# Patient Record
Sex: FEMALE | Race: WHITE | NOT HISPANIC OR LATINO | Employment: STUDENT
[De-identification: names, ages, dates, MRNs, and addresses within clinical notes are randomized per-mention and may not be internally consistent; named-entity substitution may affect disease eponyms.]

---

## 2018-11-28 ENCOUNTER — OFFICE VISIT (OUTPATIENT)
Dept: OTOLARYNGOLOGY | Facility: CLINIC | Age: 28
End: 2018-11-28

## 2018-11-28 DIAGNOSIS — Z98.890 POSTOPERATIVE STATE: Primary | ICD-10-CM

## 2018-11-28 RX ORDER — NORGESTIMATE AND ETHINYL ESTRADIOL 0.25-0.035
1 KIT ORAL DAILY
COMMUNITY

## 2018-11-28 NOTE — LETTER
11/28/2018      RE: Lora Blakely  [address]  [facility] [address]       This office note has been dictated.     VANESSA CISNEROS MD

## 2018-11-28 NOTE — LETTER
11/28/2018       RE: Lora Blakely  [address]  [facility] [address]     Dear Colleague,    Thank you for referring your patient, Lora Blakely, to the [facility] at [facility]. Please see a copy of my visit note below.    This office note has been dictated.     Again, thank you for allowing me to participate in the care of your patient.      Sincerely,    VANESSA CISNEROS MD

## 2018-11-30 NOTE — PROGRESS NOTES
Service Date: 2018      She has an excellent airway.  ...images for teaching.         VANESSA CISNEROS MD             D: 2018   T: 2018   MT: sidney      Name:     SNEHA JEFFERS   MRN:      [id number]        Account:      [id number]   :      1990           Service Date: 2018      Document: [id number]

## 2019-03-13 ENCOUNTER — OFFICE VISIT (OUTPATIENT)
Dept: PLASTIC SURGERY | Facility: CLINIC | Age: 29
End: 2019-03-13
Payer: COMMERCIAL

## 2019-03-13 DIAGNOSIS — Z98.890 POSTOPERATIVE STATE: Primary | ICD-10-CM

## 2019-03-13 NOTE — LETTER
March 13, 2019  Re: Lora Lustanleysongbelinda  1990    Dear Dr. Proctor,    Thank you so much for referring Lora Blakely to the [facility]. I had the pleasure of visiting with Lora today.     Attached you will find a copy of my note. Please feel free to reach out to me with any questions, [phone].     This office note has been dictated.       Your trust in our practice and care is much appreciated.    Sincerely,  VANESSA CISNEROS MD

## 2019-03-13 NOTE — LETTER
Date:March 14, 2019      Patient was self referred, no letter generated. Do not send.        [facility] Health Information

## 2019-03-14 NOTE — PROGRESS NOTES
Service Date: 2019      Ms. Jeffers is back.  I am seeing her following her rhinoplasty.  She is delighted with the outcome at this point.  She is very happy with how things have progressed and she would like us to carry out a chin implant for her.  She will be in contact with us about that in the future.  Photos taken today.         VANESSA CISNEROS MD             D: 2019   T: 2019   MT: sidney      Name:     SNHEA JEFFERS   MRN:      [id number]        Account:      [id number]   :      1990           Service Date: 2019      Document: [id number]

## 2019-03-18 NOTE — NURSING NOTE
Chief Complaint   Patient presents with     Post-op Visit     Patient asked Dr. Miranda about a possible chin implant surgery.  She will call me if she wants a cosmetic quote for this surgery.  Heavenly Gill, Patient Coordinator

## 2019-07-24 ENCOUNTER — OFFICE VISIT (OUTPATIENT)
Dept: PLASTIC SURGERY | Facility: CLINIC | Age: 29
End: 2019-07-24
Payer: COMMERCIAL

## 2019-07-24 DIAGNOSIS — Z98.890 POSTOPERATIVE STATE: Primary | ICD-10-CM

## 2019-07-24 NOTE — LETTER
July 24, 2019  Re: Lora Lustanleysongbelinda  1990    Dear Dr. Proctor,    Thank you so much for referring Lora Blakely to the [facility]. I had the pleasure of visiting with Lora today.     Attached you will find a copy of my note. Please feel free to reach out to me with any questions, [phone].       This office note has been dictated.      Your trust in our practice and care is much appreciated.    Sincerely,  VANESSA CISNEROS MD

## 2019-07-24 NOTE — LETTER
2019       RE: Lora Jeffers  [address]  [facility] [address]     Dear Colleague,    Thank you for referring your patient, Lora Jeffers, to the [facility]. Please see a copy of my visit note below.    Service Date: 2019      Lora is in today.  She visited with Dr. Hickman, our fellow. She is very happy with her nose. Her airway is good.  She is a bit concerned about what a chin implant might mean in terms of bony resorption.  Certainly, there is nothing mandatory about it, although it would create an anesthetic enhancement for her.  She ought not to feel any pressure to make that decision.  She worked with Dr. Hickman and Heavenly today before I had a chance to visit with her.         VANESSA CISNEROS MD           D: 2019   T: 2019   MT: ms      Name:     LORA JEFFERS   MRN:      [id number]        Account:      [id number]   :      1990           Service Date: 2019      Document: [id number]            home management/community/leisure/self-care

## 2019-07-24 NOTE — PROGRESS NOTES
This office note has been dictated.   no deformity, pain or tenderness. no restriction of movement/supple/trachea midline

## 2019-07-24 NOTE — NURSING NOTE
Chief Complaint   Patient presents with     Post-op Visit     rhinoplasty     Obtained photo documentation.

## 2019-07-25 ENCOUNTER — TELEPHONE (OUTPATIENT)
Dept: PLASTIC SURGERY | Facility: CLINIC | Age: 29
End: 2019-07-25

## 2019-07-25 NOTE — TELEPHONE ENCOUNTER
I left a message for Lora to let her know Dr. Miranda would like her to schedule a follow up visit in 4-6 months from now. Heavenly Gill, Patient Coordinator

## 2019-07-25 NOTE — PROGRESS NOTES
Service Date: 2019      Lora is in today.  She visited with Dr. Hickman, our fellow. She is very happy with her nose. Her airway is good.  She is a bit concerned about what a chin implant might mean in terms of bony resorption.  Certainly, there is nothing mandatory about it, although it would create an anesthetic enhancement for her.  She ought not to feel any pressure to make that decision.  She worked with Dr. Hickman and Heavenly today before I had a chance to visit with her.         VANESSA CISNEROS MD             D: 2019   T: 2019   MT: ms      Name:     LORA JEFFERS   MRN:      [id number]        Account:      [id number]   :      1990           Service Date: 2019      Document: [id number]

## 2019-12-11 ENCOUNTER — OFFICE VISIT (OUTPATIENT)
Dept: PLASTIC SURGERY | Facility: CLINIC | Age: 29
End: 2019-12-11
Payer: COMMERCIAL

## 2019-12-11 DIAGNOSIS — Z98.890 POSTOPERATIVE STATE: Primary | ICD-10-CM

## 2019-12-11 NOTE — LETTER
2019       RE: Lora Jeffers  [address]  [facility] [address]     Dear Colleague,    Thank you for referring your patient, Loar Jeffers, to the [facility] at [facility]. Please see a copy of my visit note below.    Service Date: 2019      Lora is in today.  Fortunately, Dr. Infante saw her for me.   She is happy with her outcome.  She breathes well.  She will be back to see me in several months.         VANESSA CISNEROS MD             D: 12/15/2019   T: 12/15/2019   MT: ms      Name:     LORA JEFFERS   MRN:      [id number]        Account:      [id number]   :      1990           Service Date: 2019      Document: [id number]

## 2019-12-11 NOTE — LETTER
December 11, 2019  Re: Lora Lustanleysongbelinda  1990    Dear Dr. Proctor,    Thank you so much for referring Lora Blakely to the [facility]. I had the pleasure of visiting with Lora today.     Attached you will find a copy of my note. Please feel free to reach out to me with any questions, [phone].       This office note has been dictated.      Your trust in our practice and care is much appreciated.    Sincerely,  VANESSA CISNEROS MD

## 2019-12-11 NOTE — LETTER
12/11/2019       RE: Lora Blakely  [address]  [facility] [address]     Dear Colleague,    Thank you for referring your patient, Lora Blakely, to the [facility] at [facility]. Please see a copy of my visit note below.      This office note has been dictated.    Again, thank you for allowing me to participate in the care of your patient.      Sincerely,    VANESSA CISNEROS MD

## 2019-12-11 NOTE — LETTER
2019       RE: Lora Jeffers  [address]  [facility] [address]     Dear Colleague,    Thank you for referring your patient, Lora Jeffers, to the [facility] at [facility]. Please see a copy of my visit note below.    Service Date: 2019      Lora is in today.  Fortunately, Dr. Infante saw her for me.   She is happy with her outcome.  She breathes well.  She will be back to see me in several months.         VANESSA CISNEROS MD             D: 12/15/2019   T: 12/15/2019   MT: ms      Name:     LORA JEFFERS   MRN:      [id number]        Account:      [id number]   :      1990           Service Date: 2019      Document: [id number]

## 2019-12-15 NOTE — PROGRESS NOTES
Service Date: 2019      Lora is in today.  Fortunately, Dr. Infante saw her for me.   She is happy with her outcome.  She breathes well.  She will be back to see me in several months.         VANESSA CISNEROS MD             D: 12/15/2019   T: 12/15/2019   MT: ms      Name:     LORA JEFFERS   MRN:      [id number]        Account:      [id number]   :      1990           Service Date: 2019      Document: [id number]

## 2020-01-10 NOTE — TELEPHONE ENCOUNTER
FUTURE VISIT INFORMATION      FUTURE VISIT INFORMATION:    Date: 2/26/20    Time: 12:30 PM    Location:  Derm  REFERRAL INFORMATION:    Referring provider:  Self    Referring providers clinic:  N/A    Reason for visit/diagnosis:  acne consult    RECORDS REQUESTED FROM:       Clinic name Comments Records Status Imaging Status                                         Action 1/10/20 MV 8.18am   Action Taken No records found for acne. Will need to call patient to confirm.

## 2020-02-26 ENCOUNTER — OFFICE VISIT (OUTPATIENT)
Dept: DERMATOLOGY | Facility: CLINIC | Age: 30
End: 2020-02-26
Payer: COMMERCIAL

## 2020-02-26 ENCOUNTER — PRE VISIT (OUTPATIENT)
Dept: DERMATOLOGY | Facility: CLINIC | Age: 30
End: 2020-02-26

## 2020-02-26 VITALS — DIASTOLIC BLOOD PRESSURE: 81 MMHG | SYSTOLIC BLOOD PRESSURE: 125 MMHG

## 2020-02-26 DIAGNOSIS — R22.9 SUBCUTANEOUS NODULE: Primary | ICD-10-CM

## 2020-02-26 RX ORDER — TRETINOIN 0.5 MG/G
CREAM TOPICAL AT BEDTIME
COMMUNITY

## 2020-02-26 RX ORDER — SPIRONOLACTONE 25 MG/1
TABLET ORAL
COMMUNITY
Start: 2019-12-30

## 2020-02-26 ASSESSMENT — PAIN SCALES - GENERAL: PAINLEVEL: NO PAIN (0)

## 2020-02-26 NOTE — NURSING NOTE
Chief Complaint   Patient presents with     Derm Problem     Lora is here today for Acne. Currently using Spironolactone, Tret 0.05%, and BCP. OTC products are Coconut Oil and Sunscreen. Lora notes a persistent area on cheek that will not heal.      Zunilda Emery LPN

## 2020-02-26 NOTE — LETTER
2/26/2020       RE: Lora Blakely  [address]  [facility] [address]     Dear Colleague,    Thank you for referring your patient, Lora Blakely, to the [facility]. Please see a copy of my visit note below.    [facility] Dermatology Note      Dermatology Problem List:  1.Acne vulgaris  -spironolactone 25 mg daily, OCP, tretinoin 0.05%  -previous tx: doxycycline, minocycline    2. Subcutaneous nodule, R cheek  -ultrasound    Encounter Date: Feb 26, 2020    CC:  Chief Complaint   Patient presents with     Derm Problem     Lora is here today for Acne. Currently using Spironolactone, Tret 0.05%, and BCP. OTC products are Coconut Oil and Sunscreen. Lora notes a persistent area on cheek that will not heal.          History of Present Illness:  Ms. Lora Blakely is a 30 year old female who  Presents as a consult for acne. Patient has not been seen in our clinic before.     Reports hormonal acne. Reports persistent pimple that has been present for over 1 year. She has tried retinoid on the area, but has seen no effect. Currently using spironolactone per her PCP and tretinoin and OCP. Not interested in increasing dose of spironolactone. The area was treated with Fractora laser at a [facility]. Reports it fluctuates once per month.     Uses coconut oil and sunscreen. No other concerns.     Past Medical History:   There is no problem list on file for this patient.    No past medical history on file.  Past Surgical History:   Procedure Laterality Date     RHINOPLASTY  05/21/2018    Dr. Kareem Miranda       Social History:  Patient reports that she has never smoked. She has never used smokeless tobacco.    Family History:  No family history on file.    Medications:  Current Outpatient Medications   Medication Sig Dispense Refill     norgestimate-ethinyl estradiol (ORTHO-CYCLEN, SPRINTEC) 0.25-35 MG-MCG per tablet  Take 1 tablet by mouth daily       spironolactone (ALDACTONE) 25 MG tablet        tretinoin (RETIN-A) 0.05 % external cream Apply topically At Bedtime         No Known Allergies    Review of Systems:  -Constitutional: Otherwise feeling well today, in usual state of health.  -Skin: As above in HPI. No additional skin concerns.    Physical exam:  Vitals: There were no vitals taken for this visit.  GEN: This is a well developed, well-nourished female in no acute distress, in a pleasant mood.    SKIN: Focused examination of the face was performed.  -subcutaneous nodule on R cheek with no open pore, mobile, barely perceptible  -No other lesions of concern on areas examined.       Impression/Plan:  1. Subcutaneous nodule, R cheek - mobile, stable for 1.5 years, no open pore, not tender - has had outside procedure at unknown cosmetic clinic on area, possibly fractora- I reviewed with the patient that this is most likely a cyst but that biopsy would be necessary for definitive diagnosis.  Due to stable nature, not clinically necessary today. We can monitor this lesion and biopsy for any changes. She is bothered by the lesion. I recommended ultrasound to see if it can confirm the nature of the lesions. Then consider kenalog if cyst. Otherwise, consider doxycycline for overall acne(which then may help this lesion).   -Discussed that laser would not treat lesion.   -Discussed that steroid injections could decrease the size, but carry risk of atrophy  -Photographs obtained today for yearly monitoring  -she will pursue ultrasound and we will contact with results    Follow up in 1 year, earlier for new or changing lesions.    Staff Involved:  Scribe/Staff      Scribe Disclosure  I, Aryan John, am serving as a scribe to document services personally performed by Dr. Rahel Antunez MD, based on data collection and the provider's statements to me.    Provider Disclosure:   The documentation recorded by the scribe accurately  reflects the services I personally performed and the decisions made by me.    Rahel Antunez MD    Department of Dermatology  [facility]: Phone: [phone], Fax:[phone]  [facility]: Phone: [phone], Fax: [phone]              Again, thank you for allowing me to participate in the care of your patient.      Sincerely,    Rahel Antunez MD

## 2020-02-26 NOTE — LETTER
Date:February 28, 2020      Patient was self referred, no letter generated. Do not send.        [facility] Health Information

## 2020-02-26 NOTE — PROGRESS NOTES
[facility] Dermatology Note      Dermatology Problem List:  1.Acne vulgaris  -spironolactone 25 mg daily, OCP, tretinoin 0.05%  -previous tx: doxycycline, minocycline    2. Subcutaneous nodule, R cheek  -ultrasound    Encounter Date: Feb 26, 2020    CC:  Chief Complaint   Patient presents with     Derm Problem     Lora is here today for Acne. Currently using Spironolactone, Tret 0.05%, and BCP. OTC products are Coconut Oil and Sunscreen. Lora notes a persistent area on cheek that will not heal.          History of Present Illness:  Ms. Lora Blakely is a 30 year old female who  Presents as a consult for acne. Patient has not been seen in our clinic before.     Reports hormonal acne. Reports persistent pimple that has been present for over 1 year. She has tried retinoid on the area, but has seen no effect. Currently using spironolactone per her PCP and tretinoin and OCP. Not interested in increasing dose of spironolactone. The area was treated with Fractora laser at a [facility]. Reports it fluctuates once per month.     Uses coconut oil and sunscreen. No other concerns.     Past Medical History:   There is no problem list on file for this patient.    No past medical history on file.  Past Surgical History:   Procedure Laterality Date     RHINOPLASTY  05/21/2018    Dr. Kareem Miranda       Social History:  Patient reports that she has never smoked. She has never used smokeless tobacco.    Family History:  No family history on file.    Medications:  Current Outpatient Medications   Medication Sig Dispense Refill     norgestimate-ethinyl estradiol (ORTHO-CYCLEN, SPRINTEC) 0.25-35 MG-MCG per tablet Take 1 tablet by mouth daily       spironolactone (ALDACTONE) 25 MG tablet        tretinoin (RETIN-A) 0.05 % external cream Apply topically At Bedtime         No Known Allergies    Review of Systems:  -Constitutional: Otherwise feeling well today, in usual state of health.  -Skin: As above in  HPI. No additional skin concerns.    Physical exam:  Vitals: There were no vitals taken for this visit.  GEN: This is a well developed, well-nourished female in no acute distress, in a pleasant mood.    SKIN: Focused examination of the face was performed.  -subcutaneous nodule on R cheek with no open pore, mobile, barely perceptible  -No other lesions of concern on areas examined.       Impression/Plan:  1. Subcutaneous nodule, R cheek - mobile, stable for 1.5 years, no open pore, not tender - has had outside procedure at unknown cosmetic clinic on area, possibly fractora- I reviewed with the patient that this is most likely a cyst but that biopsy would be necessary for definitive diagnosis.  Due to stable nature, not clinically necessary today. We can monitor this lesion and biopsy for any changes. She is bothered by the lesion. I recommended ultrasound to see if it can confirm the nature of the lesions. Then consider kenalog if cyst. Otherwise, consider doxycycline for overall acne(which then may help this lesion).   -Discussed that laser would not treat lesion.   -Discussed that steroid injections could decrease the size, but carry risk of atrophy  -Photographs obtained today for yearly monitoring  -she will pursue ultrasound and we will contact with results    Follow up in 1 year, earlier for new or changing lesions.    Staff Involved:  Scribe/Staff      Scribe Disclosure  I, Aryan John, am serving as a scribe to document services personally performed by Dr. Rahel Antunez MD, based on data collection and the provider's statements to me.    Provider Disclosure:   The documentation recorded by the scribe accurately reflects the services I personally performed and the decisions made by me.    Rahel Antunez MD    Department of Dermatology  [facility]: Phone: [phone], Fax:[phone]  [facility]: Phone: [phone], Fax: [phone]

## 2020-03-11 ENCOUNTER — HEALTH MAINTENANCE LETTER (OUTPATIENT)
Age: 30
End: 2020-03-11

## 2020-03-11 ENCOUNTER — ANCILLARY PROCEDURE (OUTPATIENT)
Dept: ULTRASOUND IMAGING | Facility: CLINIC | Age: 30
End: 2020-03-11
Attending: DERMATOLOGY
Payer: COMMERCIAL

## 2020-03-11 DIAGNOSIS — R22.9 SUBCUTANEOUS NODULE: ICD-10-CM

## 2020-03-11 PROCEDURE — 76536 US EXAM OF HEAD AND NECK: CPT | Performed by: RADIOLOGY

## 2020-03-11 NOTE — TELEPHONE ENCOUNTER
FUTURE VISIT INFORMATION      FUTURE VISIT INFORMATION:    Date: 3/26/20    Time: 8 AM    Location: [facility]  REFERRAL INFORMATION:    Referring provider:      Referring providers clinic:      Reason for visit/diagnosis: Possible Tonsil Stones    RECORDS REQUESTED FROM:       Clinic name Comments Records Status Imaging Status   [facility] 12/11/19 - PLASTIC OV with Dr. Rub[facility] Palmer    [facility] - Imaging 3/11/20 - US Head Neck Soft Tissue Epic PACs                 * 3/11/20 8:06 AM Called patient and LVM to see if she has any outside records or has seen anyone else for tonsil issues - Fang  3/11/2020 3:17PM patient called back and left a message - Amay   * 3/12/20 7:54 AM Will need signed FREDY to get records from [place], per patient was seen at [place] 3 years ago but for other issues (not tonsil stones), let nurse know if recs needed to get FREDY signed - Fang

## 2020-03-26 ENCOUNTER — PRE VISIT (OUTPATIENT)
Dept: OTOLARYNGOLOGY | Facility: CLINIC | Age: 30
End: 2020-03-26

## 2020-07-08 ENCOUNTER — OFFICE VISIT (OUTPATIENT)
Dept: PLASTIC SURGERY | Facility: CLINIC | Age: 30
End: 2020-07-08

## 2020-07-08 DIAGNOSIS — Z98.890 POSTOPERATIVE STATE: Primary | ICD-10-CM

## 2020-07-08 NOTE — LETTER
Date:July 20, 2020      Patient was self referred, no letter generated. Do not send.        [facility] Health Information

## 2020-07-08 NOTE — LETTER
2020  Re: Lora Jeffers  1990    Dear Dr. Proctor,    Thank you so much for referring Lora Jeffers to the [facility]. I had the pleasure of visiting with Lora today.     Attached you will find a copy of my note. Please feel free to reach out to me with any questions, [phone].     This office note has been dictated.     Service Date: 2020      REASON FOR VISIT:   Lora is in today and still feels that there is swelling in the supratip area bilaterally, a little bit worse on the left than the right.        PHYSICAL EXAMINATION:  The tip is a bit round.  I like its projection.  She still has microgenia.       PROCEDURE:   I injected the supratip area with some 10 mg/cc Kenalog, approximately 0.15 was used.        ASSESSMENT AND PLAN:  She will let us know how that works out and be back in touch with us.         VANESSA CISNEROS MD             D: 2020   T: 2020   MT: ms      Name:     LORA JEFFERS   MRN:      [id number]        Account:      [id number]   :      1990           Service Date: 2020      Document: [id number]         Your trust in our practice and care is much appreciated.    Sincerely,  VANESSA CISNEROS MD

## 2020-07-08 NOTE — LETTER
2020       RE: Lora Jeffers  [address]  [facility] [address]     Dear Colleague,    Thank you for referring your patient, Lora Jeffers, to the [facility] at [facility]. Please see a copy of my visit note below.    This office note has been dictated.     Service Date: 2020      REASON FOR VISIT:   Lora is in today and still feels that there is swelling in the supratip area bilaterally, a little bit worse on the left than the right.        PHYSICAL EXAMINATION:  The tip is a bit round.  I like its projection.  She still has microgenia.       PROCEDURE:   I injected the supratip area with some 10 mg/cc Kenalog, approximately 0.15 was used.        ASSESSMENT AND PLAN:  She will let us know how that works out and be back in touch with us.         VANESSA CISNEROS MD             D: 2020   T: 2020   MT: ms      Name:     LORA JEFFERS   MRN:      [id number]        Account:      [id number]   :      1990           Service Date: 2020      Document: [id number]       Again, thank you for allowing me to participate in the care of your patient.      Sincerely,    VANESSA CISNEROS MD

## 2020-07-08 NOTE — LETTER
Date:July 20, 2020      Patient was self referred, no letter generated. Do not send.        [facility] Health Information

## 2020-07-09 NOTE — PROGRESS NOTES
Service Date: 2020      REASON FOR VISIT:   Lora is in today and still feels that there is swelling in the supratip area bilaterally, a little bit worse on the left than the right.        PHYSICAL EXAMINATION:  The tip is a bit round.  I like its projection.  She still has microgenia.       PROCEDURE:   I injected the supratip area with some 10 mg/cc Kenalog, approximately 0.15 was used.        ASSESSMENT AND PLAN:  She will let us know how that works out and be back in touch with us.         VANESSA CISNEROS MD             D: 2020   T: 2020   MT: ms      Name:     LORA JEFFERS   MRN:      [id number]        Account:      [id number]   :      1990           Service Date: 2020      Document: [id number]

## 2020-07-13 NOTE — NURSING NOTE
Chief Complaint   Patient presents with     Post-op Visit     rhinoplasty     Obtained photo documentation.  Patient to follow up as needed going forward. Heavenly Gill, Patient Coordinator

## 2020-10-08 ENCOUNTER — OFFICE VISIT (OUTPATIENT)
Dept: PLASTIC SURGERY | Facility: CLINIC | Age: 30
End: 2020-10-08

## 2020-10-08 DIAGNOSIS — Z98.890 POSTOPERATIVE STATE: Primary | ICD-10-CM

## 2020-10-08 NOTE — LETTER
10/8/2020      RE: Lora Jeffers  [address]  [facility] [address]       This office note has been dictated.     Service Date: 10/08/2020      Lora breathes comfortably.  She likes the results.  She said it still swells a bit on occasion, but not a lot, but that in fact in the afternoons, the appearance is exactly what she was hoping for.  I am delighted with the results.  Photographs were taken today.  She will see us as needed.         VANESSA CISNEROS MD             D: 10/11/2020   T: 10/11/2020   MT: ms      Name:     LORA JEFFERS   MRN:      [id number]        Account:      [id number]   :      1990           Service Date: 10/08/2020      Document: [id number]        VANESSA CISNEROS MD

## 2020-10-08 NOTE — LETTER
10/8/2020       RE: Lora Blakely  [address]  [facility] [address]     Dear Colleague,    Thank you for referring your patient, Lora Blakely, to the [facility] at [facility]. Please see a copy of my visit note below.    This office note has been dictated.       Again, thank you for allowing me to participate in the care of your patient.      Sincerely,    VANESSA CISNEROS MD

## 2020-10-08 NOTE — LETTER
10/8/2020       RE: Lora Blakely  [address]  [facility] [address]     Dear Colleague,    Thank you for referring your patient, Lora Blakely, to the [facility] at [facility]. Please see a copy of my visit note below.    This office note has been dictated.       Again, thank you for allowing me to participate in the care of your patient.      Sincerely,    VANESSA CISNERSO MD

## 2020-10-08 NOTE — LETTER
October 8, 2020  Re: Lora Reddy  1990    Dear Dr. Proctor,    Thank you so much for referring Lora Blakely to the [facility]. I had the pleasure of visiting with Lora today.     Attached you will find a copy of my note. Please feel free to reach out to me with any questions, [phone].     This office note has been dictated.         Your trust in our practice and care is much appreciated.    Sincerely,  VANESSA CISNEROS MD

## 2020-10-08 NOTE — LETTER
10/8/2020       RE: Lora Blakely  [address]  [facility] [address]     Dear Colleague,    Thank you for referring your patient, Lora Blakely, to the [facility] at [facility]. Please see a copy of my visit note below.    This office note has been dictated.       Again, thank you for allowing me to participate in the care of your patient.      Sincerely,    VANESSA CISNEROS MD

## 2020-10-21 NOTE — PROGRESS NOTES
Service Date: 10/08/2020      Lora breathes comfortably.  She likes the results.  She said it still swells a bit on occasion, but not a lot, but that in fact in the afternoons, the appearance is exactly what she was hoping for.  I am delighted with the results.  Photographs were taken today.  She will see us as needed.         VANESSA CISNEROS MD             D: 10/11/2020   T: 10/11/2020   MT: ms      Name:     LORA JEFFERS   MRN:      [id number]        Account:      [id number]   :      1990           Service Date: 10/08/2020      Document: [id number]    
This office note has been dictated.   
not applicable

## 2020-11-25 ENCOUNTER — OFFICE VISIT (OUTPATIENT)
Dept: DERMATOLOGY | Facility: CLINIC | Age: 30
End: 2020-11-25
Payer: COMMERCIAL

## 2020-11-25 DIAGNOSIS — R22.9 SUBCUTANEOUS NODULE: Primary | ICD-10-CM

## 2020-11-25 PROCEDURE — 99213 OFFICE O/P EST LOW 20 MIN: CPT | Mod: GC | Performed by: DERMATOLOGY

## 2020-11-25 RX ORDER — SPIRONOLACTONE 50 MG/1
TABLET, FILM COATED ORAL
COMMUNITY
Start: 2020-11-24

## 2020-11-25 NOTE — PROGRESS NOTES
[facility] Dermatology Note      Dermatology Problem List:  1. Acne vulgaris  - spironolactone 50 mg daily (managed by Alexis), OCP, tretinoin 0.05%, coconut oil + distilled water for washing (per patient preference)   - previous tx: doxycycline, minocycline     2. Subcutaneous nodule, R cheek  - [facility], [facility]  - ultrasound 3/11/20 did not reveal any lesions   - referral to cosmetic ENT Facial Plastics Dr. Grajeda    Encounter Date: Nov 25, 2020    CC:   Chief Complaint   Patient presents with     Derm Problem     Lora is here today to follow up regarding a bump on R cheek. Reports no change since last visit accept she has a new bump beneath it. Would like to discuss possible resolution.      History of Present Illness:  Ms. Lora Blakely is a very pleasant 30 year old female who presents as a follow-up for above. The patient was last seen 2/26/20 when US was recommended.    Ultrasound was performed on 3/11/2020 and showed no abnormalities in the area where the patient indicated a palpable lump exist.  No fluid collection was seen.  No cyst or other structural abnormalities were identified.    Today, patient reports that the first lesion appeared around 2 years ago.  She denies any trauma or any triggering events but recalls that she was using as it patches around that time.  She has since stopped doing that.  About 6 months ago she had a second lesions appear that is smaller than the first 1.  They are both asymptomatic.  Not tender, painful, bleeding, or itching.  Current skin care regimen includes washing her face with a combination of coconut oil and distilled water and applying sunscreen with SPF 35 in the morning and tretinoin 0.05% at night.  She also uses tea tree oil sometimes to treat acne pimples.  Does not use anything else on her face.  She wishes to explore treatment options again today. She is very bothered by these lesions as they  can fluctuate in size. She has tried squeezing them and nothing comes out.     For her acne vulgaris, she recently increase her dose from spironolactone 25 mg daily to 50 mg daily.  She notes that this medication makes her thirsty.  Otherwise denies lightheadedness, dizziness, breast tenderness, or regular spotting.     Past Medical History:   No past medical history on file.  Past Surgical History:   Procedure Laterality Date     RHINOPLASTY  05/21/2018    Dr. Kareem Miranda     Social History:  Patient reports that she has never smoked. She has never used smokeless tobacco.    Family History:  No family history on file.    Medications:  Current Outpatient Medications   Medication Sig Dispense Refill     norgestimate-ethinyl estradiol (ORTHO-CYCLEN, SPRINTEC) 0.25-35 MG-MCG per tablet Take 1 tablet by mouth daily       spironolactone (ALDACTONE) 50 MG tablet        tretinoin (RETIN-A) 0.05 % external cream Apply topically At Bedtime       spironolactone (ALDACTONE) 25 MG tablet        No Known Allergies    Review of Systems:  -Constitutional: Denies fevers, chills, night sweats, or unintended weight loss.  -Constitutional: Otherwise feeling well today, in usual state of health.  -HEENT: Patient denies nonhealing oral sores.  -Skin: As above in HPI. No additional skin concerns.    Physical exam:  Vitals: There were no vitals taken for this visit.  GEN: This is a well developed, well-nourished female in no acute distress, in a pleasant mood.    SKIN: Focused examination of the face and neck was performed.  -Gann skin type: I  -R cheek with faint flesh colored papule, bland under dermoscopy   -R lower cheek close to the commissure of the mouth with fleshy papule with globular pigment network on dermoscopy  -R upper cheek with small erythematous slightly scaly papule at site where patient's mask rests against her skin    -No other lesions of concern on areas examined.         Impression/Plan:  1. Subcutaneous  nodule, R cheek - this appears resolved, there may be a slight skin protrusion. Has had outside procedures on this.   Unclear etiology. Unfortunately ultrasound done in March 2020 did not reveal any underlying lesions.  Discussed with patient today that we strongly advise against any interventions as these are likely to cause a worse cosmetic outcome.  Offered a trial of oral antibiotic such as doxycycline given the patient reports the lesions sometimes become inflamed, though she wished to hold off on this today and will contact us if she wants to pursue this in the future.  Recommended that she can seek a second opinion from a cosmetic ENT facial plastics physician Dr. Grajeda.  She may also follow-up with Dr. Miranda in plastic surgery as well who performed her rhinoplasty.  Patient endorsed understanding and was amenable with this plan.  All questions were answered.  - Referral to cosmetic ENT Facial Plastics Dr. Grajeda ([employer] message sent)  - Photodocumentation performed today   - Future considerations: doxycyline if patient desires   -dermatology procedural intervention would like make this lesion more obvious and less aesthetically appealing    2. Acne vulgaris  Well-controlled on current regimen, will continue without any changes. Managed by Alexis. Advised patient that spironolactone 50 mg daily is overall still a low dose and we hope that she will be able to tolerate this.  - continue spironolactone 50 mg daily   - continue tretinoin 0.05% cream nightly     3. Facial nevi  -report changes, follow when in clinic  Follow-up in 6 months.     Dr. Antunez staffed the patient.    Staff Involved:  Victorina Short MD (PGY3)/Staff    Staff Physician Comments:   I saw and evaluated the patient with the resident and I agree with the assessment and plan.  I was present for the examination..    Rahel Antunez MD    Department of Dermatology  [facility]  [facility]: Phone: [phone], Fax:[phone]  [facility]: Phone: [phone], Fax: [phone]

## 2020-11-25 NOTE — PATIENT INSTRUCTIONS
Referral to cosmetic ENT Facial Plastics Dr. Grajeda. Can also ask Dr. Miranda. Let us know if you want antibiotics.

## 2020-11-25 NOTE — LETTER
11/25/2020       RE: Lora Blakely  [address]  [facility] [address]     Dear Colleague,    Thank you for referring your patient, Lora Blakely, to the [facility] [place] [facility]. Please see a copy of my visit note below.    [facility] Dermatology Note      Dermatology Problem List:  1. Acne vulgaris  - spironolactone 50 mg daily (managed by Alexis), OCP, tretinoin 0.05%, coconut oil + distilled water for washing (per patient preference)   - previous tx: doxycycline, minocycline     2. Subcutaneous nodule, R cheek  - prior: retinoids, ?[facility] laser at a [facility]  - ultrasound 3/11/20 did not reveal any lesions   - referral to cosmetic ENT Facial Plastics Dr. Grajeda    Encounter Date: Nov 25, 2020    CC:   Chief Complaint   Patient presents with     Derm Problem     Lora is here today to follow up regarding a bump on R cheek. Reports no change since last visit accept she has a new bump beneath it. Would like to discuss possible resolution.      History of Present Illness:  Ms. Lora Blakely is a very pleasant 30 year old female who presents as a follow-up for above. The patient was last seen 2/26/20 when US was recommended.    Ultrasound was performed on 3/11/2020 and showed no abnormalities in the area where the patient indicated a palpable lump exist.  No fluid collection was seen.  No cyst or other structural abnormalities were identified.    Today, patient reports that the first lesion appeared around 2 years ago.  She denies any trauma or any triggering events but recalls that she was using as it patches around that time.  She has since stopped doing that.  About 6 months ago she had a second lesions appear that is smaller than the first 1.  They are both asymptomatic.  Not tender, painful, bleeding, or itching.  Current skin care regimen includes washing her face with a  combination of coconut oil and distilled water and applying sunscreen with SPF 35 in the morning and tretinoin 0.05% at night.  She also uses tea tree oil sometimes to treat acne pimples.  Does not use anything else on her face.  She wishes to explore treatment options again today. She is very bothered by these lesions as they can fluctuate in size. She has tried squeezing them and nothing comes out.     For her acne vulgaris, she recently increase her dose from spironolactone 25 mg daily to 50 mg daily.  She notes that this medication makes her thirsty.  Otherwise denies lightheadedness, dizziness, breast tenderness, or regular spotting.     Past Medical History:   No past medical history on file.  Past Surgical History:   Procedure Laterality Date     RHINOPLASTY  05/21/2018    Dr. Kareem Miranda     Social History:  Patient reports that she has never smoked. She has never used smokeless tobacco.    Family History:  No family history on file.    Medications:  Current Outpatient Medications   Medication Sig Dispense Refill     norgestimate-ethinyl estradiol (ORTHO-CYCLEN, SPRINTEC) 0.25-35 MG-MCG per tablet Take 1 tablet by mouth daily       spironolactone (ALDACTONE) 50 MG tablet        tretinoin (RETIN-A) 0.05 % external cream Apply topically At Bedtime       spironolactone (ALDACTONE) 25 MG tablet        No Known Allergies    Review of Systems:  -Constitutional: Denies fevers, chills, night sweats, or unintended weight loss.  -Constitutional: Otherwise feeling well today, in usual state of health.  -HEENT: Patient denies nonhealing oral sores.  -Skin: As above in HPI. No additional skin concerns.    Physical exam:  Vitals: There were no vitals taken for this visit.  GEN: This is a well developed, well-nourished female in no acute distress, in a pleasant mood.    SKIN: Focused examination of the face and neck was performed.  -Gann skin type: I  -R cheek with faint flesh colored papule, bland under dermoscopy    -R lower cheek close to the commissure of the mouth with fleshy papule with globular pigment network on dermoscopy  -R upper cheek with small erythematous slightly scaly papule at site where patient's mask rests against her skin    -No other lesions of concern on areas examined.         Impression/Plan:  1. Subcutaneous nodule, R cheek - this appears resolved, there may be a slight skin protrusion. Has had outside procedures on this.   Unclear etiology. Unfortunately ultrasound done in March 2020 did not reveal any underlying lesions.  Discussed with patient today that we strongly advise against any interventions as these are likely to cause a worse cosmetic outcome.  Offered a trial of oral antibiotic such as doxycycline given the patient reports the lesions sometimes become inflamed, though she wished to hold off on this today and will contact us if she wants to pursue this in the future.  Recommended that she can seek a second opinion from a cosmetic ENT facial plastics physician Dr. Grajeda.  She may also follow-up with Dr. Miranda in plastic surgery as well who performed her rhinoplasty.  Patient endorsed understanding and was amenable with this plan.  All questions were answered.  - Referral to cosmetic ENT Facial Plastics Dr. Grajeda ([employer] message sent)  - Photodocumentation performed today   - Future considerations: doxycyline if patient desires   -dermatology procedural intervention would like make this lesion more obvious and less aesthetically appealing    2. Acne vulgaris  Well-controlled on current regimen, will continue without any changes. Managed by Alexis. Advised patient that spironolactone 50 mg daily is overall still a low dose and we hope that she will be able to tolerate this.  - continue spironolactone 50 mg daily   - continue tretinoin 0.05% cream nightly     3. Facial nevi  -report changes, follow when in clinic  Follow-up in 6 months.     Dr. Antunez staffed the patient.    Staff  Involved:  Victorina Short MD (PGY3)/Staff    Staff Physician Comments:   I saw and evaluated the patient with the resident and I agree with the assessment and plan.  I was present for the examination..    Rahel Antunez MD    Department of Dermatology  [facility]: Phone: [phone], Fax:[phone]  [facility]: Phone: [phone], Fax: [phone]

## 2020-11-27 ENCOUNTER — TELEPHONE (OUTPATIENT)
Dept: OTOLARYNGOLOGY | Facility: CLINIC | Age: 30
End: 2020-11-27

## 2020-11-27 NOTE — TELEPHONE ENCOUNTER
"LVM stating I am attempting to schedule patient based of referral from Dr. Antunez. Left ENT scheduling number for patient to call. Sent letter to make patient aware of referral.    SCHEDULING INSTRUCTIONS: Please schedule patient for next available new cosmetic appointment with Dr. Grajeda. Please include in notes \"Subcutaneous nodule [R22.9] referred by Dr. Antunez to Dr. Grajeda\"    Thank you.  "

## 2020-12-02 NOTE — TELEPHONE ENCOUNTER
FUTURE VISIT INFORMATION      FUTURE VISIT INFORMATION:    Date: 1/6/2021    Time: 1:20PM    [place] [facility] [place]  REFERRAL INFORMATION:    Referring provider:   Dr Rahel Antunez    Referring providers clinic:  [facility]    Reason for visit/diagnosis  two subcutaneous nodules on the R cheek x 2 years and 6 months // Ref by Dr. Short    RECORDS REQUESTED FROM:       Clinic name Comments Records Status Imaging Status   [facility] 11/25/2020 note from Dr Rahel Antunez Epic    [facility] 10/8/2020 note from Dr Kareem Miranda Epic    Imaging 3/11/2020  Head Neck  Epic PACS

## 2021-01-03 ENCOUNTER — HEALTH MAINTENANCE LETTER (OUTPATIENT)
Age: 31
End: 2021-01-03

## 2021-01-06 ENCOUNTER — PRE VISIT (OUTPATIENT)
Dept: OTOLARYNGOLOGY | Facility: CLINIC | Age: 31
End: 2021-01-06

## 2021-04-16 ENCOUNTER — OFFICE VISIT (OUTPATIENT)
Dept: PLASTIC SURGERY | Facility: CLINIC | Age: 31
End: 2021-04-16
Payer: COMMERCIAL

## 2021-04-16 DIAGNOSIS — L98.9 BENIGN SKIN LESION OF CHEEK: Primary | ICD-10-CM

## 2021-04-16 NOTE — LETTER
4/16/2021       RE: Lora Blakely  [address]  [facility] [address]     Dear Colleague,    Thank you for referring your patient, Lora Blakely, to the [facility] at [facility]. Please see a copy of my visit note below.    Facial Plastic and Reconstructive Surgery Consultation    Referring Provider:   Victorina Short MD  [address]  [place] [address]    HPI:   I had the pleasure of seeing Lora Blakely today in clinic for consultation for subcutaneous nodules.     Lora Blakely is a 31 year old female with a history of cystic acne and now notes all significant residual effects most pacifically to subcutaneous nodular areas in the right cheek.  1 she states is larger than the other.  It does fluctuate in size.  This has been evaluated by dermatology in the past.  It was noted not to be a significant or concerning lesion to require intervention.  She comes today for consultation to assess possible management.  She does state that the date but the lesion is not as palpable as it has been in the past.  She also notes she has another cyst on her back and wants that evaluated.    She has a history of mole removal 3 on her face that were fully excised and states.  She has healed well from these.  Otherwise she does not have any other concerns or complications.        Review Of Systems  ROS: 10 point ROS neg other than the symptoms noted above in the HPI.    There is no problem list on file for this patient.    Past Surgical History:   Procedure Laterality Date     RHINOPLASTY  05/21/2018    Dr. Kareem Miranda     Current Outpatient Medications   Medication Sig Dispense Refill     norgestimate-ethinyl estradiol (ORTHO-CYCLEN, SPRINTEC) 0.25-35 MG-MCG per tablet Take 1 tablet by mouth daily       spironolactone (ALDACTONE) 25 MG tablet        spironolactone (ALDACTONE) 50 MG tablet        tretinoin  (RETIN-A) 0.05 % external cream Apply topically At Bedtime       Patient has no known allergies.  Social History     Socioeconomic History     Marital status: Single     Spouse name: Not on file     Number of children: Not on file     Years of education: Not on file     Highest education level: Not on file   Occupational History     Not on file   Social Needs     Financial resource strain: Not on file     Food insecurity     Worry: Not on file     Inability: Not on file     Transportation needs     Medical: Not on file     Non-medical: Not on file   Tobacco Use     Smoking status: Never Smoker     Smokeless tobacco: Never Used   Substance and Sexual Activity     Alcohol use: Not on file     Drug use: Not on file     Sexual activity: Not on file   Lifestyle     Physical activity     Days per week: Not on file     Minutes per session: Not on file     Stress: Not on file   Relationships     Social connections     Talks on phone: Not on file     Gets together: Not on file     Attends Buddhist service: Not on file     Active member of club or organization: Not on file     Attends meetings of clubs or organizations: Not on file     Relationship status: Not on file     Intimate partner violence     Fear of current or ex partner: Not on file     Emotionally abused: Not on file     Physically abused: Not on file     Forced sexual activity: Not on file   Other Topics Concern     Not on file   Social History Narrative     Not on file     No family history on file.    PE:  Examination notes a fullness of an area of the right cheek superior to the mid nasolabial fold.  This is not a discrete circumscribed mass.  It does not feel hard.  It feels soft and just is an increased density of the subcutaneous fat.  It is not painful to palpation and is actually hard to feel the borders.  She notes there is a more inferiorly based cyst cannot be readily palpated today.  On her back she has but is a small dermal inclusion cyst  approximately 2 mm that does not have significant sebaceous debris and is stable.  To the left of it she has had a prior mole excision where she had a scar that expanded which she completed with a tattoo.  The rest of the head and neck examination is benign today.              IMPRESSION:  Possible dermal inclusion cyst.      PLAN:    Lora Blakely presents today with a history and presentation consistent with possible dermal inclusion cyst.  She has manipulated these in the past and thus this might be why I cannot feel significantly distinct borders.  They are quite actually difficult to palpate in the cheek.  When I cannot palpate along the other just feels like an increased density of the fat.  Does not feel with a discrete lesion.  I stated that it would be beneficial if she sent us a picture where this did develop more prominence in the future or that she would come in at that point.  A possible intervention at that point could be injection of Kenalog.  At this point I do not see anything that is requiring treatment or further evaluation.  Is not concerning for malignancy based on the characteristics on examination.  In terms of her back I think excision of this 2 mm cystic lesion would lead to another stretch scar as the one that she has.  This was concerning to her in the past and thus I think there is potentially less morbidity with a small cyst then there would be with the scar.  She understood and will follow up with us as needed.    Photodocumentation was obtained.     I spent a total of 30 minutes face-to-face with Lora Blakely during today's office visit.  Over 50% of this time was spent counseling the patient and/or coordinating care regarding cheek lesions.  See note for details.          Again, thank you for allowing me to participate in the care of your patient.      Sincerely,    Shyanne Grajeda MD

## 2021-04-16 NOTE — PROGRESS NOTES
Facial Plastic and Reconstructive Surgery Consultation    Referring Provider:   Victorina Short MD  [address] [facility] [address]  [place] [address]    HPI:   I had the pleasure of seeing Lora Blakely today in clinic for consultation for subcutaneous nodules.     Lora Blakely is a 31 year old female with a history of cystic acne and now notes all significant residual effects most pacifically to subcutaneous nodular areas in the right cheek.  1 she states is larger than the other.  It does fluctuate in size.  This has been evaluated by dermatology in the past.  It was noted not to be a significant or concerning lesion to require intervention.  She comes today for consultation to assess possible management.  She does state that the date but the lesion is not as palpable as it has been in the past.  She also notes she has another cyst on her back and wants that evaluated.    She has a history of mole removal 3 on her face that were fully excised and states.  She has healed well from these.  Otherwise she does not have any other concerns or complications.        Review Of Systems  ROS: 10 point ROS neg other than the symptoms noted above in the HPI.    There is no problem list on file for this patient.    Past Surgical History:   Procedure Laterality Date     RHINOPLASTY  05/21/2018    Dr. Kareem Miranda     Current Outpatient Medications   Medication Sig Dispense Refill     norgestimate-ethinyl estradiol (ORTHO-CYCLEN, SPRINTEC) 0.25-35 MG-MCG per tablet Take 1 tablet by mouth daily       spironolactone (ALDACTONE) 25 MG tablet        spironolactone (ALDACTONE) 50 MG tablet        tretinoin (RETIN-A) 0.05 % external cream Apply topically At Bedtime       Patient has no known allergies.  Social History     Socioeconomic History     Marital status: Single     Spouse name: Not on file     Number of children: Not on file     Years of education: Not on file     Highest education level: Not on file    Occupational History     Not on file   Social Needs     Financial resource strain: Not on file     Food insecurity     Worry: Not on file     Inability: Not on file     Transportation needs     Medical: Not on file     Non-medical: Not on file   Tobacco Use     Smoking status: Never Smoker     Smokeless tobacco: Never Used   Substance and Sexual Activity     Alcohol use: Not on file     Drug use: Not on file     Sexual activity: Not on file   Lifestyle     Physical activity     Days per week: Not on file     Minutes per session: Not on file     Stress: Not on file   Relationships     Social connections     Talks on phone: Not on file     Gets together: Not on file     Attends [employer] service: Not on file     Active member of club or organization: Not on file     Attends meetings of clubs or organizations: Not on file     Relationship status: Not on file     Intimate partner violence     Fear of current or ex partner: Not on file     Emotionally abused: Not on file     Physically abused: Not on file     Forced sexual activity: Not on file   Other Topics Concern     Not on file   Social History Narrative     Not on file     No family history on file.    PE:  Examination notes a fullness of an area of the right cheek superior to the mid nasolabial fold.  This is not a discrete circumscribed mass.  It does not feel hard.  It feels soft and just is an increased density of the subcutaneous fat.  It is not painful to palpation and is actually hard to feel the borders.  She notes there is a more inferiorly based cyst cannot be readily palpated today.  On her back she has but is a small dermal inclusion cyst approximately 2 mm that does not have significant sebaceous debris and is stable.  To the left of it she has had a prior mole excision where she had a scar that expanded which she completed with a tattoo.  The rest of the head and neck examination is benign today.              IMPRESSION:  Possible dermal inclusion  cyst.      PLAN:    Lora Ludeeptibelinda presents today with a history and presentation consistent with possible dermal inclusion cyst.  She has manipulated these in the past and thus this might be why I cannot feel significantly distinct borders.  They are quite actually difficult to palpate in the cheek.  When I cannot palpate along the other just feels like an increased density of the fat.  Does not feel with a discrete lesion.  I stated that it would be beneficial if she sent us a picture where this did develop more prominence in the future or that she would come in at that point.  A possible intervention at that point could be injection of Kenalog.  At this point I do not see anything that is requiring treatment or further evaluation.  Is not concerning for malignancy based on the characteristics on examination.  In terms of her back I think excision of this 2 mm cystic lesion would lead to another stretch scar as the one that she has.  This was concerning to her in the past and thus I think there is potentially less morbidity with a small cyst then there would be with the scar.  She understood and will follow up with us as needed.    Photodocumentation was obtained.     I spent a total of 30 minutes face-to-face with Lora Lumaria luisa during today's office visit.  Over 50% of this time was spent counseling the patient and/or coordinating care regarding cheek lesions.  See note for details.

## 2021-04-16 NOTE — NURSING NOTE
Photodocumentation obtained.    Pt will text photo when/if bump on the right cheek returns.    Marysol Hebert RN  4/16/2021 4:45 PM

## 2021-04-16 NOTE — LETTER
April 16, 2021  Re: Lora Blakely  1990    Dear Dr. Short,    Thank you so much for referring Lora Blakely to the [facility]. I had the pleasure of visiting with Lora today.     Attached you will find a copy of my note. Please feel free to reach out to me with any questions, [phone].     Facial Plastic and Reconstructive Surgery Consultation    Referring Provider:   Victorina Short MD  [address] [place] [address]    HPI:   I had the pleasure of seeing Lora Blakely today in clinic for consultation for subcutaneous nodules.     Lora Blakely is a 31 year old female with a history of cystic acne and now notes all significant residual effects most pacifically to subcutaneous nodular areas in the right cheek.  1 she states is larger than the other.  It does fluctuate in size.  This has been evaluated by dermatology in the past.  It was noted not to be a significant or concerning lesion to require intervention.  She comes today for consultation to assess possible management.  She does state that the date but the lesion is not as palpable as it has been in the past.  She also notes she has another cyst on her back and wants that evaluated.    She has a history of mole removal 3 on her face that were fully excised and states.  She has healed well from these.  Otherwise she does not have any other concerns or complications.        Review Of Systems  ROS: 10 point ROS neg other than the symptoms noted above in the HPI.    There is no problem list on file for this patient.    Past Surgical History:   Procedure Laterality Date     RHINOPLASTY  05/21/2018    Dr. Kareem Miranda     Current Outpatient Medications   Medication Sig Dispense Refill     norgestimate-ethinyl estradiol (ORTHO-CYCLEN, SPRINTEC) 0.25-35 MG-MCG per tablet Take 1 tablet by mouth daily       spironolactone (ALDACTONE) 25 MG tablet        spironolactone (ALDACTONE) 50 MG  tablet        tretinoin (RETIN-A) 0.05 % external cream Apply topically At Bedtime       Patient has no known allergies.  Social History     Socioeconomic History     Marital status: Single     Spouse name: Not on file     Number of children: Not on file     Years of education: Not on file     Highest education level: Not on file   Occupational History     Not on file   Social Needs     Financial resource strain: Not on file     Food insecurity     Worry: Not on file     Inability: Not on file     Transportation needs     Medical: Not on file     Non-medical: Not on file   Tobacco Use     Smoking status: Never Smoker     Smokeless tobacco: Never Used   Substance and Sexual Activity     Alcohol use: Not on file     Drug use: Not on file     Sexual activity: Not on file   Lifestyle     Physical activity     Days per week: Not on file     Minutes per session: Not on file     Stress: Not on file   Relationships     Social connections     Talks on phone: Not on file     Gets together: Not on file     Attends [employer] service: Not on file     Active member of club or organization: Not on file     Attends meetings of clubs or organizations: Not on file     Relationship status: Not on file     Intimate partner violence     Fear of current or ex partner: Not on file     Emotionally abused: Not on file     Physically abused: Not on file     Forced sexual activity: Not on file   Other Topics Concern     Not on file   Social History Narrative     Not on file     No family history on file.    PE:  Examination notes a fullness of an area of the right cheek superior to the mid nasolabial fold.  This is not a discrete circumscribed mass.  It does not feel hard.  It feels soft and just is an increased density of the subcutaneous fat.  It is not painful to palpation and is actually hard to feel the borders.  She notes there is a more inferiorly based cyst cannot be readily palpated today.  On her back she has but is a small dermal  inclusion cyst approximately 2 mm that does not have significant sebaceous debris and is stable.  To the left of it she has had a prior mole excision where she had a scar that expanded which she completed with a tattoo.  The rest of the head and neck examination is benign today.              IMPRESSION:  Possible dermal inclusion cyst.      PLAN:    Lora Blakely presents today with a history and presentation consistent with possible dermal inclusion cyst.  She has manipulated these in the past and thus this might be why I cannot feel significantly distinct borders.  They are quite actually difficult to palpate in the cheek.  When I cannot palpate along the other just feels like an increased density of the fat.  Does not feel with a discrete lesion.  I stated that it would be beneficial if she sent us a picture where this did develop more prominence in the future or that she would come in at that point.  A possible intervention at that point could be injection of Kenalog.  At this point I do not see anything that is requiring treatment or further evaluation.  Is not concerning for malignancy based on the characteristics on examination.  In terms of her back I think excision of this 2 mm cystic lesion would lead to another stretch scar as the one that she has.  This was concerning to her in the past and thus I think there is potentially less morbidity with a small cyst then there would be with the scar.  She understood and will follow up with us as needed.    Photodocumentation was obtained.     I spent a total of 30 minutes face-to-face with Lora Blakely during today's office visit.  Over 50% of this time was spent counseling the patient and/or coordinating care regarding cheek lesions.  See note for details.            Your trust in our practice and care is much appreciated.    Sincerely,  Shyanne Grajeda MD

## 2021-04-25 ENCOUNTER — HEALTH MAINTENANCE LETTER (OUTPATIENT)
Age: 31
End: 2021-04-25

## 2021-08-06 ENCOUNTER — OFFICE VISIT (OUTPATIENT)
Dept: PLASTIC SURGERY | Facility: CLINIC | Age: 31
End: 2021-08-06
Payer: COMMERCIAL

## 2021-08-06 DIAGNOSIS — L98.9 BENIGN SKIN LESION OF CHEEK: Primary | ICD-10-CM

## 2021-08-06 NOTE — LETTER
8/6/2021       RE: Lora Blakely  [address]  [facility] [address]     Dear Colleague,    Thank you for referring your patient, Lora Blakely, to the [facility]. Please see a copy of my visit note below.    Facial Plastic and Reconstructive Surgery      Lora Blakely comes in for follow up.  She notes that the subcutaneous nodule has not changed and is much less noticeable. She agrees not to intervene.    She notes that the swelling on her nose is still variable and does respond to light tapping of the tip. I placed some kenalog today to help with an area of residual fullness.          Again, thank you for allowing me to participate in the care of your patient.      Sincerely,    Shyanne Grajeda MD

## 2021-08-06 NOTE — LETTER
8/6/2021       RE: Lora Blakely  [address]  [facility] [address]     Dear Colleague,    Thank you for referring your patient, Lora Blakely, to the [facility] at [facility]. Please see a copy of my visit note below.    Facial Plastic and Reconstructive Surgery      Lora Blakely comes in for follow up.  She notes that the subcutaneous nodule has not changed and is much less noticeable. She agrees not to intervene.    She notes that the swelling on her nose is still variable and does respond to light tapping of the tip. I placed some kenalog today to help with an area of residual fullness.          Again, thank you for allowing me to participate in the care of your patient.      Sincerely,    Shyanne Grajeda MD

## 2021-08-06 NOTE — LETTER
September 20, 2021  Re: Lora BESS Reddy  1990    Dear Dr. Proctor,    Thank you so much for referring Lora Blakely to the [facility]. I had the pleasure of visiting with Lora today.     Attached you will find a copy of my note. Please feel free to reach out to me with any questions, [phone].     Facial Plastic and Reconstructive Surgery      Lora Blakely comes in for follow up.  She notes that the subcutaneous nodule has not changed and is much less noticeable. She agrees not to intervene.    She notes that the swelling on her nose is still variable and does respond to light tapping of the tip. I placed some kenalog today to help with an area of residual fullness.            Your trust in our practice and care is much appreciated.    Sincerely,  Shyanne Grajeda MD

## 2021-09-20 NOTE — PROGRESS NOTES
Facial Plastic and Reconstructive Surgery      Lora Blakely comes in for follow up.  She notes that the subcutaneous nodule has not changed and is much less noticeable. She agrees not to intervene.    She notes that the swelling on her nose is still variable and does respond to light tapping of the tip. I placed some kenalog today to help with an area of residual fullness.

## 2021-10-10 ENCOUNTER — HEALTH MAINTENANCE LETTER (OUTPATIENT)
Age: 31
End: 2021-10-10

## 2022-05-21 ENCOUNTER — HEALTH MAINTENANCE LETTER (OUTPATIENT)
Age: 32
End: 2022-05-21

## 2022-07-07 ENCOUNTER — OFFICE VISIT (OUTPATIENT)
Dept: PLASTIC SURGERY | Facility: CLINIC | Age: 32
End: 2022-07-07
Payer: COMMERCIAL

## 2022-07-07 DIAGNOSIS — L90.5 SCAR CONDITION AND FIBROSIS OF SKIN: Primary | ICD-10-CM

## 2022-07-07 NOTE — LETTER
Date:July 7, 2022      Provider requested that no letter be sent. Do not send.       [facility]

## 2022-07-07 NOTE — LETTER
Date:July 7, 2022      Provider requested that no letter be sent. Do not send.       [facility]

## 2022-07-07 NOTE — LETTER
July 7, 2022  Re: Lora Blakely  1990    Dear Dr. Proctor,    Thank you so much for referring Lora Blakely to the [facility]. I had the pleasure of visiting with Lora today.     Attached you will find a copy of my note. Please feel free to reach out to me with any questions, [phone].     This office note has been dictated.         Your trust in our practice and care is much appreciated.    Sincerely,  VANESSA CISNEROS MD

## 2022-07-07 NOTE — LETTER
7/7/2022       RE: Lora Osbornekylechandra  [address] [facility] [address]     Dear Colleague,    Thank you for referring your patient, Lora Blakely, to the [facility] at [facility]. Please see a copy of my visit note below.    This office note has been dictated.       Again, thank you for allowing me to participate in the care of your patient.      Sincerely,    VANESSA CISNEROS MD

## 2022-07-09 NOTE — PROGRESS NOTES
Service Date: 2022    HISTORY OF PRESENT ILLNESS: Lora is in today.  She notes an intermittent prominence inside the left vestibule in the area of the lateral portion of the marginal incision.  She brings in a picture showing that it was more prominent previously.      PHYSICAL EXAMINATION: It is less so now.  There is some fluctuation in it and a subtle scar band there.      RECOMMENDATIONS/PLAN: I would not recommend doing anything with it.  I was candid with her about that.  I think it will get slightly better with time.  I think any aggressive intervention would be a mistake.  She will see me as needed.      Kareem Miranda MD        D: 2022   T: 2022   MT: ms    Name:     LORA JEFFERS  MRN:      [id number]        Account:      [id number]   :      1990           Service Date: 2022       Document: [id number]

## 2022-09-18 ENCOUNTER — HEALTH MAINTENANCE LETTER (OUTPATIENT)
Age: 32
End: 2022-09-18

## 2023-01-28 ENCOUNTER — HEALTH MAINTENANCE LETTER (OUTPATIENT)
Age: 33
End: 2023-01-28

## 2024-02-25 ENCOUNTER — HEALTH MAINTENANCE LETTER (OUTPATIENT)
Age: 34
End: 2024-02-25

## 2025-03-09 ENCOUNTER — HEALTH MAINTENANCE LETTER (OUTPATIENT)
Age: 35
End: 2025-03-09